# Patient Record
Sex: FEMALE | NOT HISPANIC OR LATINO | URBAN - METROPOLITAN AREA
[De-identification: names, ages, dates, MRNs, and addresses within clinical notes are randomized per-mention and may not be internally consistent; named-entity substitution may affect disease eponyms.]

---

## 2017-05-11 ENCOUNTER — IMPORTED ENCOUNTER (OUTPATIENT)
Dept: URBAN - METROPOLITAN AREA CLINIC 31 | Facility: CLINIC | Age: 77
End: 2017-05-11

## 2017-05-11 PROBLEM — H16.143: Noted: 2017-05-11

## 2017-05-11 PROBLEM — H43.812: Noted: 2017-05-11

## 2017-05-11 PROBLEM — H04.123: Noted: 2017-05-11

## 2017-05-11 PROBLEM — Z96.1: Noted: 2017-05-11

## 2017-05-11 PROBLEM — Z94.7: Noted: 2017-05-11

## 2017-05-11 PROCEDURE — 92015 DETERMINE REFRACTIVE STATE: CPT

## 2017-05-11 PROCEDURE — 92014 COMPRE OPH EXAM EST PT 1/>: CPT

## 2017-05-11 NOTE — PATIENT DISCUSSION
1. 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 PK OD 1994 PK OS 2013. Continue topical fml qhs. Rejection and vaccinations reviewed. 2. Pseudophakia OU - IOLs stable. open capsules ou. Monitor. 3. PVD OS: Patient was cautioned to call our office immediately if they experience a substantial change in their symptoms such as an increase in floaters persistent flashes loss of visual field (may appear as a shadow or a curtain) or decrease in visual acuity as these may indicate a retinal tear or detachment. 4.  Dry Eye OU:  Continue current management with Artificial Tears. 5.  SPK:  dense OS>OD. Recommend artificial tears 3-4x per day and oils. 6.  OD RGP fits good. Cont with current cl.  7.  Hx fungal ulcer OS 8.   RTN 1 yr CE

## 2018-03-19 ENCOUNTER — IMPORTED ENCOUNTER (OUTPATIENT)
Dept: URBAN - METROPOLITAN AREA CLINIC 31 | Facility: CLINIC | Age: 78
End: 2018-03-19

## 2018-03-19 PROCEDURE — 92310 CONTACT LENS FITTING OU: CPT

## 2018-03-19 PROCEDURE — 92015 DETERMINE REFRACTIVE STATE: CPT

## 2018-03-19 PROCEDURE — 92014 COMPRE OPH EXAM EST PT 1/>: CPT

## 2018-03-19 NOTE — PATIENT DISCUSSION
1. 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 PK OD 1994 PK OS 2013. Continue topical fml qhs. Rejection and vaccinations reviewed. 2. Pseudophakia OU - IOLs stable. open capsules ou. Monitor. 3. PVD OS: Patient was cautioned to call our office immediately if they experience a substantial change in their symptoms such as an increase in floaters persistent flashes loss of visual field 4. Dry Eye OU:  Continue current management with Artificial Tears. 5.  SPK:  dense OS>OD. Recommend artificial tears 3-4x per day and oils. 6.  OD RGP fits good. Order new RGP Surg[facility]ens BK Back Toric. [place] EO green 6.92/6.78 -5.25 Diam 11.00 CT . 14.  CHange rx. and ship to pt. Eval 90. RGP $250.   7. Hx fungal ulcer OS 8.   RTN  3/19 CE

## 2018-03-20 PROBLEM — H04.123: Noted: 2018-03-20

## 2018-03-20 PROBLEM — Z96.1: Noted: 2018-03-20

## 2018-03-20 PROBLEM — H43.812: Noted: 2018-03-20

## 2018-03-20 PROBLEM — Z94.7: Noted: 2018-03-20

## 2018-03-20 PROBLEM — H16.143: Noted: 2018-03-20

## 2022-04-02 ASSESSMENT — TONOMETRY
OS_IOP_MMHG: 16
OD_IOP_MMHG: 14
OD_IOP_MMHG: 16
OS_IOP_MMHG: 14

## 2022-04-02 ASSESSMENT — VISUAL ACUITY
OS_SC: 20/50
OS_SC: 20/70-1
OS_CC: 20/40
OD_SC: 20/30
OU_SC: 20/30
OD_SC: 20/80

## 2022-06-04 ENCOUNTER — TELEPHONE ENCOUNTER (OUTPATIENT)
Dept: URBAN - METROPOLITAN AREA CLINIC 68 | Facility: CLINIC | Age: 82
End: 2022-06-04

## 2022-06-04 RX ORDER — POLYETHYLENE GLYCOL 3350, SODIUM SULFATE, SODIUM CHLORIDE, POTASSIUM CHLORIDE, ASCORBIC ACID, SODIUM ASCORBATE 7.5-2.691G
KIT ORAL AS DIRECTED
Qty: 1 | Refills: 0 | OUTPATIENT
Start: 2012-02-16 | End: 2012-03-23

## 2022-06-05 ENCOUNTER — TELEPHONE ENCOUNTER (OUTPATIENT)
Dept: URBAN - METROPOLITAN AREA CLINIC 68 | Facility: CLINIC | Age: 82
End: 2022-06-05

## 2022-06-05 RX ORDER — ALPHA LIPOIC ACID 200 MG
ACETYL L-CARNITINE( 250MG ORAL  DAILY ) ACTIVE -HX ENTRY CAPSULE ORAL DAILY
Status: ACTIVE | COMMUNITY
Start: 2013-05-03

## 2022-06-05 RX ORDER — CALCIUM CARBONATE 600 MG
CALCIUM 600( 1500MG ORAL  DAILY ) ACTIVE -HX ENTRY TABLET ORAL DAILY
Status: ACTIVE | COMMUNITY
Start: 2013-05-03

## 2022-06-05 RX ORDER — METOPROLOL SUCCINATE 25 MG/1
TOPROL XL( 25MG ORAL  DAILY ) ACTIVE -HX ENTRY TABLET, EXTENDED RELEASE ORAL DAILY
Status: ACTIVE | COMMUNITY
Start: 2013-05-03

## 2022-06-05 RX ORDER — UBIDECARENONE 200 MG
COQ10( 200MG ORAL  DAILY ) ACTIVE -HX ENTRY CAPSULE ORAL DAILY
Status: ACTIVE | COMMUNITY
Start: 2013-05-03

## 2022-06-25 ENCOUNTER — TELEPHONE ENCOUNTER (OUTPATIENT)
Age: 82
End: 2022-06-25

## 2022-06-25 RX ORDER — POLYETHYLENE GLYCOL 3350, SODIUM SULFATE, SODIUM CHLORIDE, POTASSIUM CHLORIDE, ASCORBIC ACID, SODIUM ASCORBATE 7.5-2.691G
KIT ORAL AS DIRECTED
Qty: 1 | Refills: 0 | OUTPATIENT
Start: 2012-02-16 | End: 2012-03-23

## 2022-06-26 ENCOUNTER — TELEPHONE ENCOUNTER (OUTPATIENT)
Age: 82
End: 2022-06-26

## 2022-06-26 RX ORDER — ST. JOHN'S WORT 300 MG
ALPHA LIPOIC ACID( 600MG ORAL  DAILY ) ACTIVE -HX ENTRY CAPSULE ORAL DAILY
Status: ACTIVE | COMMUNITY
Start: 2013-05-03

## 2022-06-26 RX ORDER — UBIDECARENONE 200 MG
COQ10( 200MG ORAL  DAILY ) ACTIVE -HX ENTRY CAPSULE ORAL DAILY
Status: ACTIVE | COMMUNITY
Start: 2013-05-03

## 2022-06-26 RX ORDER — CHLORHEXIDINE GLUCONATE 4 %
RED YEAST RICE( 600MG ORAL  DAILY ) ACTIVE -HX ENTRY LIQUID (ML) TOPICAL DAILY
Status: ACTIVE | COMMUNITY
Start: 2013-05-03

## 2022-06-26 RX ORDER — METOPROLOL SUCCINATE 25 MG/1
TOPROL XL( 25MG ORAL  DAILY ) ACTIVE -HX ENTRY TABLET, EXTENDED RELEASE ORAL DAILY
Status: ACTIVE | COMMUNITY
Start: 2013-05-03